# Patient Record
Sex: MALE | Race: WHITE
[De-identification: names, ages, dates, MRNs, and addresses within clinical notes are randomized per-mention and may not be internally consistent; named-entity substitution may affect disease eponyms.]

---

## 2017-02-20 ENCOUNTER — HOSPITAL ENCOUNTER (OUTPATIENT)
Age: 72
Discharge: HOME | End: 2017-02-20
Payer: MEDICARE

## 2017-02-20 DIAGNOSIS — K64.4: ICD-10-CM

## 2017-02-20 DIAGNOSIS — K57.30: ICD-10-CM

## 2017-02-20 DIAGNOSIS — K64.8: ICD-10-CM

## 2017-02-20 DIAGNOSIS — E78.5: ICD-10-CM

## 2017-02-20 DIAGNOSIS — I10: ICD-10-CM

## 2017-02-20 DIAGNOSIS — Z12.11: Primary | ICD-10-CM

## 2017-02-20 PROCEDURE — 0DJD8ZZ INSPECTION OF LOWER INTESTINAL TRACT, VIA NATURAL OR ARTIFICIAL OPENING ENDOSCOPIC: ICD-10-PCS | Performed by: SURGERY

## 2017-12-27 ENCOUNTER — HOSPITAL ENCOUNTER (OUTPATIENT)
Dept: HOSPITAL 76 - LAB.WCP | Age: 72
Discharge: HOME | End: 2017-12-27
Attending: FAMILY MEDICINE
Payer: MEDICARE

## 2017-12-27 DIAGNOSIS — Z78.9: Primary | ICD-10-CM

## 2017-12-27 PROCEDURE — 86803 HEPATITIS C AB TEST: CPT

## 2017-12-27 PROCEDURE — 36415 COLL VENOUS BLD VENIPUNCTURE: CPT

## 2018-08-08 ENCOUNTER — HOSPITAL ENCOUNTER (OUTPATIENT)
Dept: HOSPITAL 76 - LAB.WCP | Age: 73
Discharge: HOME | End: 2018-08-08
Attending: FAMILY MEDICINE
Payer: MEDICARE

## 2018-08-08 DIAGNOSIS — R97.20: Primary | ICD-10-CM

## 2018-08-08 LAB
PSA FREE MFR SERPL: 24 % (ref 25–100)
PSA FREE SERPL-MCNC: 1.17 NG/ML (ref 0.16–2.81)
PSA SERPL-MCNC: 4.84 NG/ML (ref 0–2)

## 2018-08-08 PROCEDURE — 36415 COLL VENOUS BLD VENIPUNCTURE: CPT

## 2018-08-08 PROCEDURE — 84154 ASSAY OF PSA FREE: CPT

## 2018-08-17 ENCOUNTER — HOSPITAL ENCOUNTER (OUTPATIENT)
Dept: HOSPITAL 76 - DI | Age: 73
Discharge: HOME | End: 2018-08-17
Attending: FAMILY MEDICINE
Payer: MEDICARE

## 2018-08-17 DIAGNOSIS — K22.5: Primary | ICD-10-CM

## 2018-08-17 DIAGNOSIS — R13.10: ICD-10-CM

## 2018-08-17 PROCEDURE — 74220 X-RAY XM ESOPHAGUS 1CNTRST: CPT

## 2018-08-17 NOTE — XRAY REPORT
Procedure Date:  08/17/2018   

Accession Number:  [id number] / [id number]                    

Procedure:  FL  - Esophogram CPT Code:  

 

FULL RESULT:

 

 

EXAM:

BARIUM ESOPHAGRAM

 

EXAM DATE: 8/17/2018 12:04 PM.

 

CLINICAL HISTORY: Dysphagia.

 

COMPARISONS: None.

 

TECHNIQUE: Routine double contrast esophagram.

Fluoroscopy Time: 3 minutes 45 seconds.

Number of Images: 76.

 

FINDINGS:

Swallowing Mechanism: Normal. No tracheal aspiration or penetration.

 

Esophageal Motility: Disorganized.

 

Mucosa: Normal. No ulcerations or masses.

 

Gastroesophageal Junction: Normal. No hernia, stricture, or significant 

reflux.

 

Other: Mid esophageal diverticulum.

IMPRESSION: The mid esophageal diverticulum in the setting of 

disorganized esophageal motility favors a pulsion diverticulum over a 

traction diverticulum.

 

[facility]

## 2021-02-23 ENCOUNTER — HOSPITAL ENCOUNTER (OUTPATIENT)
Dept: HOSPITAL 76 - LAB.WCP | Age: 76
Discharge: HOME | End: 2021-02-23
Attending: INTERNAL MEDICINE
Payer: MEDICARE

## 2021-02-23 DIAGNOSIS — R97.20: ICD-10-CM

## 2021-02-23 DIAGNOSIS — R80.9: Primary | ICD-10-CM

## 2021-02-23 LAB
CLARITY UR REFRACT.AUTO: CLEAR
CREAT UR-SCNC: 162.1 MG/DL
GLUCOSE UR QL STRIP.AUTO: NEGATIVE MG/DL
KETONES UR QL STRIP.AUTO: NEGATIVE MG/DL
MICROALBUMIN UR-MCNC: 0.7 MG/DL (ref 0–300)
MICROALBUMIN/CREAT RATIO PNL UR: 4.3 UG/MG (ref ?–30)
NITRITE UR QL STRIP.AUTO: NEGATIVE
PH UR STRIP.AUTO: 7 PH (ref 5–7.5)
PROT 24H UR-MCNC: 10 MG/DL
PROT UR STRIP.AUTO-MCNC: NEGATIVE MG/DL
RBC # UR STRIP.AUTO: NEGATIVE /UL
RBC # URNS HPF: (no result) /HPF (ref 0–5)
SP GR UR STRIP.AUTO: 1.02 (ref 1–1.03)
SQUAMOUS URNS QL MICRO: (no result)
UROBILINOGEN UR QL STRIP.AUTO: (no result) E.U./DL
UROBILINOGEN UR STRIP.AUTO-MCNC: NEGATIVE MG/DL

## 2021-02-23 PROCEDURE — 81001 URINALYSIS AUTO W/SCOPE: CPT

## 2021-02-23 PROCEDURE — 82043 UR ALBUMIN QUANTITATIVE: CPT

## 2021-02-23 PROCEDURE — 84156 ASSAY OF PROTEIN URINE: CPT

## 2021-02-23 PROCEDURE — 87086 URINE CULTURE/COLONY COUNT: CPT

## 2021-02-23 PROCEDURE — 82570 ASSAY OF URINE CREATININE: CPT

## 2021-05-14 ENCOUNTER — HOSPITAL ENCOUNTER (OUTPATIENT)
Dept: HOSPITAL 76 - LAB.WCP | Age: 76
Discharge: HOME | End: 2021-05-14
Attending: INTERNAL MEDICINE
Payer: COMMERCIAL

## 2021-05-14 DIAGNOSIS — I10: Primary | ICD-10-CM

## 2021-05-14 DIAGNOSIS — E78.5: ICD-10-CM

## 2021-05-14 DIAGNOSIS — M10.9: ICD-10-CM

## 2021-05-14 DIAGNOSIS — K22.5: ICD-10-CM

## 2021-05-14 DIAGNOSIS — R97.20: ICD-10-CM

## 2021-05-14 DIAGNOSIS — R13.10: ICD-10-CM

## 2021-05-14 LAB
ALBUMIN DIAFP-MCNC: 4.5 G/DL (ref 3.2–5.5)
ALBUMIN/GLOB SERPL: 1.4 {RATIO} (ref 1–2.2)
ALP SERPL-CCNC: 57 IU/L (ref 42–121)
ALT SERPL W P-5'-P-CCNC: 25 IU/L (ref 10–60)
ANION GAP SERPL CALCULATED.4IONS-SCNC: 12 MMOL/L (ref 6–13)
AST SERPL W P-5'-P-CCNC: 34 IU/L (ref 10–42)
BASOPHILS NFR BLD AUTO: 0.1 10^3/UL (ref 0–0.1)
BASOPHILS NFR BLD AUTO: 1.4 %
BILIRUB BLD-MCNC: 1.1 MG/DL (ref 0.2–1)
BUN SERPL-MCNC: 19 MG/DL (ref 6–20)
CALCIUM UR-MCNC: 9.8 MG/DL (ref 8.5–10.3)
CHLORIDE SERPL-SCNC: 97 MMOL/L (ref 101–111)
CHOLEST SERPL-MCNC: 215 MG/DL
CO2 SERPL-SCNC: 29 MMOL/L (ref 21–32)
CREAT SERPLBLD-SCNC: 1.1 MG/DL (ref 0.6–1.2)
EOSINOPHIL # BLD AUTO: 0.2 10^3/UL (ref 0–0.7)
EOSINOPHIL NFR BLD AUTO: 3.4 %
ERYTHROCYTE [DISTWIDTH] IN BLOOD BY AUTOMATED COUNT: 13.2 % (ref 12–15)
GFRSERPLBLD MDRD-ARVRAT: 65 ML/MIN/{1.73_M2} (ref 89–?)
GLOBULIN SER-MCNC: 3.3 G/DL (ref 2.1–4.2)
GLUCOSE SERPL-MCNC: 95 MG/DL (ref 70–100)
HCT VFR BLD AUTO: 47.8 % (ref 42–52)
HDLC SERPL-MCNC: 51 MG/DL
HDLC SERPL: 4.2 {RATIO} (ref ?–5)
HGB UR QL STRIP: 15.9 G/DL (ref 14–18)
LDLC SERPL CALC-MCNC: 135 MG/DL
LDLC/HDLC SERPL: 2.6 {RATIO} (ref ?–3.6)
LYMPHOCYTES # SPEC AUTO: 1.1 10^3/UL (ref 1.5–3.5)
LYMPHOCYTES NFR BLD AUTO: 21.5 %
MCH RBC QN AUTO: 29.6 PG (ref 27–31)
MCHC RBC AUTO-ENTMCNC: 33.3 G/DL (ref 32–36)
MCV RBC AUTO: 89 FL (ref 80–94)
MONOCYTES # BLD AUTO: 0.7 10^3/UL (ref 0–1)
MONOCYTES NFR BLD AUTO: 13.3 %
NEUTROPHILS # BLD AUTO: 3 10^3/UL (ref 1.5–6.6)
NEUTROPHILS # SNV AUTO: 5 X10^3/UL (ref 4.8–10.8)
NEUTROPHILS NFR BLD AUTO: 59.8 %
NRBC # BLD AUTO: 0 /100WBC
NRBC # BLD AUTO: 0 X10^3/UL
PDW BLD AUTO: 11.2 FL (ref 7.4–11.4)
PLATELET # BLD: 97 10^3/UL (ref 130–450)
POTASSIUM SERPL-SCNC: 3.4 MMOL/L (ref 3.5–5)
PROT SPEC-MCNC: 7.8 G/DL (ref 6.7–8.2)
PSA FREE MFR SERPL: 25 % (ref 25–100)
PSA FREE SERPL-MCNC: 1.14 NG/ML (ref 0.16–2.81)
PSA SERPL-MCNC: 4.62 NG/ML (ref 0–2)
RBC MAR: 5.37 10^6/UL (ref 4.7–6.1)
SODIUM SERPLBLD-SCNC: 138 MMOL/L (ref 135–145)
TRIGL P FAST SERPL-MCNC: 145 MG/DL
TSH SERPL-ACNC: 4.36 UIU/ML (ref 0.34–5.6)
URATE SERPL-MCNC: 5.8 MG/DL (ref 2.6–7.2)
VLDLC SERPL-SCNC: 29 MG/DL

## 2021-05-14 PROCEDURE — 84550 ASSAY OF BLOOD/URIC ACID: CPT

## 2021-05-14 PROCEDURE — 85025 COMPLETE CBC W/AUTO DIFF WBC: CPT

## 2021-05-14 PROCEDURE — 84153 ASSAY OF PSA TOTAL: CPT

## 2021-05-14 PROCEDURE — 84154 ASSAY OF PSA FREE: CPT

## 2021-05-14 PROCEDURE — 83721 ASSAY OF BLOOD LIPOPROTEIN: CPT

## 2021-05-14 PROCEDURE — 84443 ASSAY THYROID STIM HORMONE: CPT

## 2021-05-14 PROCEDURE — 36415 COLL VENOUS BLD VENIPUNCTURE: CPT

## 2021-05-14 PROCEDURE — 80053 COMPREHEN METABOLIC PANEL: CPT

## 2021-05-14 PROCEDURE — 80061 LIPID PANEL: CPT

## 2022-12-14 ENCOUNTER — HOSPITAL ENCOUNTER (OUTPATIENT)
Dept: HOSPITAL 76 - LAB.N | Age: 77
Discharge: HOME | End: 2022-12-14
Attending: INTERNAL MEDICINE
Payer: MEDICARE

## 2022-12-14 DIAGNOSIS — R97.20: Primary | ICD-10-CM

## 2022-12-14 DIAGNOSIS — R31.9: ICD-10-CM

## 2022-12-14 LAB
CLARITY UR REFRACT.AUTO: CLEAR
GLUCOSE UR QL STRIP.AUTO: NEGATIVE MG/DL
KETONES UR QL STRIP.AUTO: NEGATIVE MG/DL
NITRITE UR QL STRIP.AUTO: NEGATIVE
PH UR STRIP.AUTO: 5.5 PH (ref 5–7.5)
PROT UR STRIP.AUTO-MCNC: NEGATIVE MG/DL
RBC # UR STRIP.AUTO: NEGATIVE /UL
RBC # URNS HPF: (no result) /HPF (ref 0–5)
SP GR UR STRIP.AUTO: 1.02 (ref 1–1.03)
SQUAMOUS URNS QL MICRO: (no result)
UROBILINOGEN UR QL STRIP.AUTO: (no result) E.U./DL
UROBILINOGEN UR STRIP.AUTO-MCNC: NEGATIVE MG/DL
WBC # UR MANUAL: (no result) /HPF (ref 0–3)

## 2022-12-14 PROCEDURE — 87086 URINE CULTURE/COLONY COUNT: CPT

## 2022-12-14 PROCEDURE — 81001 URINALYSIS AUTO W/SCOPE: CPT

## 2023-04-26 ENCOUNTER — HOSPITAL ENCOUNTER (OUTPATIENT)
Dept: HOSPITAL 76 - LAB | Age: 78
Discharge: HOME | End: 2023-04-26
Attending: UROLOGY
Payer: MEDICARE

## 2023-04-26 DIAGNOSIS — K57.30: ICD-10-CM

## 2023-04-26 DIAGNOSIS — Z87.898: ICD-10-CM

## 2023-04-26 DIAGNOSIS — R31.0: ICD-10-CM

## 2023-04-26 DIAGNOSIS — N40.1: Primary | ICD-10-CM

## 2023-04-26 LAB
CREAT SERPLBLD-SCNC: 1.1 MG/DL (ref 0.6–1.2)
GFRSERPLBLD MDRD-ARVRAT: 65 ML/MIN/{1.73_M2} (ref 89–?)

## 2023-04-26 PROCEDURE — 36415 COLL VENOUS BLD VENIPUNCTURE: CPT

## 2023-04-26 PROCEDURE — 74178 CT ABD&PLV WO CNTR FLWD CNTR: CPT

## 2023-04-26 PROCEDURE — 82565 ASSAY OF CREATININE: CPT

## 2023-04-26 NOTE — CT REPORT
PROCEDURE:  IVP

 

INDICATIONS:  LOWER URINARY TRACT SX

 

CONTRAST: 140ml Omnipaque 300 

 

TECHNIQUE:  

After the administration of intravenous contrast, 5 mm thick sections acquired from the diaphragms to
 the symphysis.  5 mm thick coronal and sagittal reformats were acquired.  For radiation dose reducti
on, the following was used:  automated exposure control, adjustment of mA and/or kV according to alessia
ent size. 

 

COMPARISON:  None.

 

FINDINGS:  

Image quality: Excellent.

 

Urinary system:  Both kidneys are normal in size. No hydronephrosis or nephrolithiasis on pre-contras
t images.  No solid masses or complex cysts which require follow up. The opacified renal calyces and 
ureters appear normal, without filling defect.  Bladder wall thickness is normal, accounting for unde
rdistention. No calcified bladder stones. There is significant enlargement of the prostate with nodul
ar extrinsic compressions on the base of the bladder. No intrinsic bladder filling defect is felt to 
be present.

 

OTHER

Lung bases and heart: Unremarkable. 

 

Liver: Unremarkable.

Gallbladder and biliary tree: Unremarkable. No biliary dilation.

Spleen: Unremarkable.

Pancreas: Unremarkable.

Adrenals: Unremarkable.

 

Bowel and peritoneum: No bowel distension. No pathologic free fluid. Extensive sigmoid diverticulosis
 without evidence of diverticulitis. Scattered diverticulosis elsewhere within the colon. 

Abdominal Lymph nodes: No central or retroperitoneal adenopathy.

Vessels: Unremarkable.

 

Reproductive organs: Unremarkable.

Pelvic Lymph nodes: Unremarkable.

Bones: No aggressive osseous abnormality.

 

Other: None.

 

IMPRESSION:  

 

1. No renal stone, ureteral stone, renal mass, or hydronephrosis.

 

2. The prostate is significantly enlarged. There are nodular impressions on the base of the bladder w
hich are felt to very likely be extrinsic impressions from prostate nodularity, and felt to be unlike
ly from additional intrinsic polypoid lesions.

 

3. Extensive sigmoid diverticulosis without evidence of diverticulitis.

 

Reviewed by: Ayan Rodriguez MD on 4/26/2023 4:33 PM PDT

Approved by: Ayan Rodriguez MD on 4/26/2023 4:33 PM PDT

 

 

Station ID:  [id number]